# Patient Record
Sex: MALE | Race: WHITE | Employment: STUDENT | URBAN - METROPOLITAN AREA
[De-identification: names, ages, dates, MRNs, and addresses within clinical notes are randomized per-mention and may not be internally consistent; named-entity substitution may affect disease eponyms.]

---
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
## 2021-03-22 ENCOUNTER — WALK IN (OUTPATIENT)
Dept: URGENT CARE | Age: 21
End: 2021-03-22

## 2021-03-22 VITALS
HEART RATE: 80 BPM | DIASTOLIC BLOOD PRESSURE: 70 MMHG | SYSTOLIC BLOOD PRESSURE: 150 MMHG | OXYGEN SATURATION: 99 % | RESPIRATION RATE: 16 BRPM | TEMPERATURE: 98.2 F

## 2021-03-22 DIAGNOSIS — R11.0 NAUSEA: ICD-10-CM

## 2021-03-22 DIAGNOSIS — R19.7 DIARRHEA, UNSPECIFIED TYPE: Primary | ICD-10-CM

## 2021-03-22 LAB — SARS-COV+SARS-COV-2 AG RESP QL IA.RAPID: NOT DETECTED

## 2021-03-22 PROCEDURE — 87426 SARSCOV CORONAVIRUS AG IA: CPT | Performed by: EMERGENCY MEDICINE

## 2021-03-22 PROCEDURE — 99213 OFFICE O/P EST LOW 20 MIN: CPT | Performed by: EMERGENCY MEDICINE

## 2021-03-22 RX ORDER — ONDANSETRON 4 MG/1
4 TABLET, FILM COATED ORAL EVERY 8 HOURS PRN
Qty: 10 TABLET | Refills: 0 | Status: SHIPPED | OUTPATIENT
Start: 2021-03-22 | End: 2021-03-27

## 2021-05-26 VITALS
RESPIRATION RATE: 20 BRPM | HEART RATE: 76 BPM | DIASTOLIC BLOOD PRESSURE: 80 MMHG | OXYGEN SATURATION: 100 % | TEMPERATURE: 97.9 F | SYSTOLIC BLOOD PRESSURE: 128 MMHG

## 2021-07-06 ENCOUNTER — OFFICE VISIT (OUTPATIENT)
Dept: FAMILY MEDICINE CLINIC | Facility: CLINIC | Age: 21
End: 2021-07-06

## 2021-07-06 VITALS
TEMPERATURE: 98 F | WEIGHT: 183 LBS | BODY MASS INDEX: 25.06 KG/M2 | RESPIRATION RATE: 16 BRPM | SYSTOLIC BLOOD PRESSURE: 132 MMHG | HEIGHT: 71.5 IN | OXYGEN SATURATION: 99 % | DIASTOLIC BLOOD PRESSURE: 68 MMHG | HEART RATE: 99 BPM

## 2021-07-06 DIAGNOSIS — Z02.0 SCHOOL PHYSICAL EXAM: Primary | ICD-10-CM

## 2021-07-06 PROCEDURE — 3008F BODY MASS INDEX DOCD: CPT | Performed by: NURSE PRACTITIONER

## 2021-07-06 PROCEDURE — 99385 PREV VISIT NEW AGE 18-39: CPT | Performed by: NURSE PRACTITIONER

## 2021-07-06 PROCEDURE — 3078F DIAST BP <80 MM HG: CPT | Performed by: NURSE PRACTITIONER

## 2021-07-06 PROCEDURE — 3075F SYST BP GE 130 - 139MM HG: CPT | Performed by: NURSE PRACTITIONER

## 2021-07-06 NOTE — PROGRESS NOTES
CHIEF COMPLAINT:   Patient presents with:  Physical: Entered by patient      HPI:   Aaron Lechuga is a 22year old male who presents for a physical exam for [employer] 3rd year. Patient has no health concerns.   Unable to get into PCP till Visual Acuity     Vision Screen Test Type: Snellen Wall Chart    Right Eye Visual Acuity: Uncorrected Right Eye Chart Acuity: 20/20   Left Eye Visual Acuity: Uncorrected Left Eye Chart Acuity: 20/20   Both Eyes Visual Acuity: Uncorrected Both Eyes Vicky

## 2021-12-06 ENCOUNTER — HOSPITAL ENCOUNTER (OUTPATIENT)
Age: 21
Discharge: HOME | End: 2021-12-06
Attending: EMERGENCY MEDICINE
Payer: COMMERCIAL

## 2021-12-06 VITALS
DIASTOLIC BLOOD PRESSURE: 72 MMHG | BODY MASS INDEX: 26 KG/M2 | RESPIRATION RATE: 20 BRPM | HEART RATE: 104 BPM | TEMPERATURE: 98 F | WEIGHT: 190 LBS | OXYGEN SATURATION: 100 % | SYSTOLIC BLOOD PRESSURE: 135 MMHG

## 2021-12-06 DIAGNOSIS — B34.9 VIRAL SYNDROME: Primary | ICD-10-CM

## 2021-12-06 PROCEDURE — 99203 OFFICE O/P NEW LOW 30 MIN: CPT

## 2021-12-06 PROCEDURE — 87502 INFLUENZA DNA AMP PROBE: CPT | Performed by: EMERGENCY MEDICINE

## 2021-12-06 PROCEDURE — 99212 OFFICE O/P EST SF 10 MIN: CPT

## 2021-12-06 NOTE — ED PROVIDER NOTES
Patient Seen in: [facility]      History   Patient presents with:   Body ache and/or chills    Stated Complaint: covid test    Subjective:   HPI    81-year-old male complaining of fever achiness runny nose slight cough generalized body ach nonspecific viral syndrome advised Tylenol Advil follow-up doctor in few days return any problems.                              Disposition and Plan     Clinical Impression:  Viral syndrome  (primary encounter diagnosis)     Disposition:  Discharge  12/6/20

## 2022-01-04 ENCOUNTER — WALK IN (OUTPATIENT)
Dept: URGENT CARE | Age: 22
End: 2022-01-04

## 2022-01-04 VITALS
HEART RATE: 98 BPM | RESPIRATION RATE: 18 BRPM | OXYGEN SATURATION: 98 % | DIASTOLIC BLOOD PRESSURE: 83 MMHG | SYSTOLIC BLOOD PRESSURE: 150 MMHG | TEMPERATURE: 98.2 F

## 2022-01-04 DIAGNOSIS — Z20.822 SUSPECTED COVID-19 VIRUS INFECTION: Primary | ICD-10-CM

## 2022-01-04 LAB — SARS-COV+SARS-COV-2 AG RESP QL IA.RAPID: DETECTED

## 2022-01-04 PROCEDURE — 99214 OFFICE O/P EST MOD 30 MIN: CPT | Performed by: FAMILY MEDICINE

## 2022-01-04 PROCEDURE — 87426 SARSCOV CORONAVIRUS AG IA: CPT | Performed by: FAMILY MEDICINE

## 2022-06-28 ENCOUNTER — OFFICE VISIT (OUTPATIENT)
Dept: INTERNAL MEDICINE CLINIC | Facility: CLINIC | Age: 22
End: 2022-06-28
Payer: COMMERCIAL

## 2022-06-28 VITALS
HEIGHT: 71.5 IN | DIASTOLIC BLOOD PRESSURE: 82 MMHG | WEIGHT: 197 LBS | RESPIRATION RATE: 16 BRPM | HEART RATE: 81 BPM | OXYGEN SATURATION: 99 % | BODY MASS INDEX: 26.98 KG/M2 | TEMPERATURE: 98 F | SYSTOLIC BLOOD PRESSURE: 126 MMHG

## 2022-06-28 DIAGNOSIS — Z13.29 THYROID DISORDER SCREEN: ICD-10-CM

## 2022-06-28 DIAGNOSIS — Z13.0 SCREENING, IRON DEFICIENCY ANEMIA: ICD-10-CM

## 2022-06-28 DIAGNOSIS — Z13.89 SCREENING FOR GENITOURINARY CONDITION: ICD-10-CM

## 2022-06-28 DIAGNOSIS — Z13.220 LIPID SCREENING: ICD-10-CM

## 2022-06-28 DIAGNOSIS — Z23 NEED FOR TDAP VACCINATION: ICD-10-CM

## 2022-06-28 DIAGNOSIS — Z00.00 LABORATORY EXAMINATION ORDERED AS PART OF A ROUTINE GENERAL MEDICAL EXAMINATION: ICD-10-CM

## 2022-06-28 DIAGNOSIS — Z00.00 ANNUAL PHYSICAL EXAM: Primary | ICD-10-CM

## 2022-06-28 PROCEDURE — 90471 IMMUNIZATION ADMIN: CPT | Performed by: INTERNAL MEDICINE

## 2022-06-28 PROCEDURE — 3008F BODY MASS INDEX DOCD: CPT | Performed by: INTERNAL MEDICINE

## 2022-06-28 PROCEDURE — 99395 PREV VISIT EST AGE 18-39: CPT | Performed by: INTERNAL MEDICINE

## 2022-06-28 PROCEDURE — 3079F DIAST BP 80-89 MM HG: CPT | Performed by: INTERNAL MEDICINE

## 2022-06-28 PROCEDURE — 90715 TDAP VACCINE 7 YRS/> IM: CPT | Performed by: INTERNAL MEDICINE

## 2022-06-28 PROCEDURE — 3074F SYST BP LT 130 MM HG: CPT | Performed by: INTERNAL MEDICINE

## 2022-07-07 ENCOUNTER — LAB ENCOUNTER (OUTPATIENT)
Dept: LAB | Age: 22
End: 2022-07-07
Attending: INTERNAL MEDICINE
Payer: COMMERCIAL

## 2022-07-07 DIAGNOSIS — Z13.220 LIPID SCREENING: ICD-10-CM

## 2022-07-07 DIAGNOSIS — Z00.00 LABORATORY EXAMINATION ORDERED AS PART OF A ROUTINE GENERAL MEDICAL EXAMINATION: ICD-10-CM

## 2022-07-07 DIAGNOSIS — Z13.29 THYROID DISORDER SCREEN: ICD-10-CM

## 2022-07-07 DIAGNOSIS — Z13.89 SCREENING FOR GENITOURINARY CONDITION: ICD-10-CM

## 2022-07-07 DIAGNOSIS — Z13.0 SCREENING, IRON DEFICIENCY ANEMIA: ICD-10-CM

## 2022-07-07 PROBLEM — E03.8 SUBCLINICAL HYPOTHYROIDISM: Status: ACTIVE | Noted: 2022-07-07

## 2022-07-07 LAB
ALBUMIN SERPL-MCNC: 4.2 G/DL (ref 3.4–5)
ALBUMIN/GLOB SERPL: 1.4 {RATIO} (ref 1–2)
ALP LIVER SERPL-CCNC: 64 U/L
ALT SERPL-CCNC: 50 U/L
ANION GAP SERPL CALC-SCNC: 5 MMOL/L (ref 0–18)
AST SERPL-CCNC: 29 U/L (ref 15–37)
BASOPHILS # BLD AUTO: 0.03 X10(3) UL (ref 0–0.2)
BASOPHILS NFR BLD AUTO: 0.5 %
BILIRUB SERPL-MCNC: 0.6 MG/DL (ref 0.1–2)
BILIRUB UR QL STRIP.AUTO: NEGATIVE
BUN BLD-MCNC: 17 MG/DL (ref 7–18)
CALCIUM BLD-MCNC: 9.9 MG/DL (ref 8.5–10.1)
CHLORIDE SERPL-SCNC: 108 MMOL/L (ref 98–112)
CHOLEST SERPL-MCNC: 178 MG/DL (ref ?–200)
CLARITY UR REFRACT.AUTO: CLEAR
CO2 SERPL-SCNC: 27 MMOL/L (ref 21–32)
COLOR UR AUTO: YELLOW
CREAT BLD-MCNC: 1.04 MG/DL
EOSINOPHIL # BLD AUTO: 0.11 X10(3) UL (ref 0–0.7)
EOSINOPHIL NFR BLD AUTO: 1.9 %
ERYTHROCYTE [DISTWIDTH] IN BLOOD BY AUTOMATED COUNT: 12.2 %
EST. AVERAGE GLUCOSE BLD GHB EST-MCNC: 100 MG/DL (ref 68–126)
FASTING PATIENT LIPID ANSWER: YES
FASTING STATUS PATIENT QL REPORTED: YES
GLOBULIN PLAS-MCNC: 3 G/DL (ref 2.8–4.4)
GLUCOSE BLD-MCNC: 83 MG/DL (ref 70–99)
GLUCOSE UR STRIP.AUTO-MCNC: NEGATIVE MG/DL
HBA1C MFR BLD: 5.1 % (ref ?–5.7)
HCT VFR BLD AUTO: 47.2 %
HDLC SERPL-MCNC: 38 MG/DL (ref 40–59)
HGB BLD-MCNC: 15.4 G/DL
IMM GRANULOCYTES # BLD AUTO: 0.02 X10(3) UL (ref 0–1)
IMM GRANULOCYTES NFR BLD: 0.3 %
KETONES UR STRIP.AUTO-MCNC: NEGATIVE MG/DL
LDLC SERPL CALC-MCNC: 117 MG/DL (ref ?–100)
LYMPHOCYTES # BLD AUTO: 1.63 X10(3) UL (ref 1–4)
LYMPHOCYTES NFR BLD AUTO: 27.8 %
MCH RBC QN AUTO: 31.4 PG (ref 26–34)
MCHC RBC AUTO-ENTMCNC: 32.6 G/DL (ref 31–37)
MCV RBC AUTO: 96.3 FL
MONOCYTES # BLD AUTO: 0.73 X10(3) UL (ref 0.1–1)
MONOCYTES NFR BLD AUTO: 12.5 %
NEUTROPHILS # BLD AUTO: 3.34 X10 (3) UL (ref 1.5–7.7)
NEUTROPHILS # BLD AUTO: 3.34 X10(3) UL (ref 1.5–7.7)
NEUTROPHILS NFR BLD AUTO: 57 %
NITRITE UR QL STRIP.AUTO: NEGATIVE
NONHDLC SERPL-MCNC: 140 MG/DL (ref ?–130)
OSMOLALITY SERPL CALC.SUM OF ELEC: 291 MOSM/KG (ref 275–295)
PH UR STRIP.AUTO: 6 [PH] (ref 5–8)
PLATELET # BLD AUTO: 242 10(3)UL (ref 150–450)
POTASSIUM SERPL-SCNC: 4 MMOL/L (ref 3.5–5.1)
PROT SERPL-MCNC: 7.2 G/DL (ref 6.4–8.2)
PROT UR STRIP.AUTO-MCNC: NEGATIVE MG/DL
RBC # BLD AUTO: 4.9 X10(6)UL
RBC UR QL AUTO: NEGATIVE
SODIUM SERPL-SCNC: 140 MMOL/L (ref 136–145)
SP GR UR STRIP.AUTO: >=1.03 (ref 1–1.03)
T4 FREE SERPL-MCNC: 0.9 NG/DL (ref 0.8–1.7)
TRIGL SERPL-MCNC: 126 MG/DL (ref 30–149)
TSI SER-ACNC: 4.34 MIU/ML (ref 0.36–3.74)
UROBILINOGEN UR STRIP.AUTO-MCNC: 0.2 MG/DL
VLDLC SERPL CALC-MCNC: 22 MG/DL (ref 0–30)
WBC # BLD AUTO: 5.9 X10(3) UL (ref 4–11)

## 2022-07-07 PROCEDURE — 84443 ASSAY THYROID STIM HORMONE: CPT

## 2022-07-07 PROCEDURE — 81001 URINALYSIS AUTO W/SCOPE: CPT

## 2022-07-07 PROCEDURE — 36415 COLL VENOUS BLD VENIPUNCTURE: CPT

## 2022-07-07 PROCEDURE — 83036 HEMOGLOBIN GLYCOSYLATED A1C: CPT

## 2022-07-07 PROCEDURE — 80053 COMPREHEN METABOLIC PANEL: CPT

## 2022-07-07 PROCEDURE — 84439 ASSAY OF FREE THYROXINE: CPT

## 2022-07-07 PROCEDURE — 80061 LIPID PANEL: CPT

## 2022-07-07 PROCEDURE — 85025 COMPLETE CBC W/AUTO DIFF WBC: CPT

## 2022-07-13 ENCOUNTER — TELEPHONE (OUTPATIENT)
Dept: INTERNAL MEDICINE CLINIC | Facility: CLINIC | Age: 22
End: 2022-07-13

## 2022-07-13 DIAGNOSIS — R82.71 BACTERIA IN URINE: Primary | ICD-10-CM

## 2022-07-13 NOTE — TELEPHONE ENCOUNTER
Patient returned call regarding these test results. We had been trying to contact patient regarding these results. Pt verbalized understanding and intent to comply. UA w/reflex culture placed. Advised patient to go to any [facility] lab to have urine test rechecked. Pt verbalized understanding.

## 2022-08-10 ENCOUNTER — OFFICE VISIT (OUTPATIENT)
Dept: INTERNAL MEDICINE CLINIC | Facility: CLINIC | Age: 22
End: 2022-08-10
Payer: COMMERCIAL

## 2022-08-10 DIAGNOSIS — Z02.9 ADMINISTRATIVE ENCOUNTER: Primary | ICD-10-CM

## 2023-09-01 ENCOUNTER — OFFICE VISIT (OUTPATIENT)
Dept: INTERNAL MEDICINE CLINIC | Facility: CLINIC | Age: 23
End: 2023-09-01
Payer: COMMERCIAL

## 2023-09-01 VITALS
TEMPERATURE: 99 F | HEIGHT: 71.5 IN | BODY MASS INDEX: 27.52 KG/M2 | OXYGEN SATURATION: 99 % | WEIGHT: 201 LBS | HEART RATE: 83 BPM | RESPIRATION RATE: 18 BRPM | SYSTOLIC BLOOD PRESSURE: 108 MMHG | DIASTOLIC BLOOD PRESSURE: 76 MMHG

## 2023-09-01 DIAGNOSIS — Z00.00 ANNUAL PHYSICAL EXAM: Primary | ICD-10-CM

## 2023-09-01 DIAGNOSIS — Z00.00 ROUTINE GENERAL MEDICAL EXAMINATION AT A HEALTH CARE FACILITY: ICD-10-CM

## 2023-09-11 ENCOUNTER — LAB ENCOUNTER (OUTPATIENT)
Dept: LAB | Age: 23
End: 2023-09-11
Attending: INTERNAL MEDICINE
Payer: COMMERCIAL

## 2023-09-11 DIAGNOSIS — Z00.00 ROUTINE GENERAL MEDICAL EXAMINATION AT A HEALTH CARE FACILITY: ICD-10-CM

## 2023-09-11 LAB
ALBUMIN SERPL-MCNC: 4.3 G/DL (ref 3.4–5)
ALBUMIN/GLOB SERPL: 1.4 {RATIO} (ref 1–2)
ALP LIVER SERPL-CCNC: 62 U/L
ALT SERPL-CCNC: 62 U/L
ANION GAP SERPL CALC-SCNC: 6 MMOL/L (ref 0–18)
AST SERPL-CCNC: 31 U/L (ref 15–37)
BASOPHILS # BLD AUTO: 0.02 X10(3) UL (ref 0–0.2)
BASOPHILS NFR BLD AUTO: 0.3 %
BILIRUB SERPL-MCNC: 0.3 MG/DL (ref 0.1–2)
BILIRUB UR QL STRIP.AUTO: NEGATIVE
BUN BLD-MCNC: 20 MG/DL (ref 7–18)
CALCIUM BLD-MCNC: 9.6 MG/DL (ref 8.5–10.1)
CHLORIDE SERPL-SCNC: 107 MMOL/L (ref 98–112)
CHOLEST SERPL-MCNC: 177 MG/DL (ref ?–200)
CLARITY UR REFRACT.AUTO: CLEAR
CO2 SERPL-SCNC: 26 MMOL/L (ref 21–32)
CREAT BLD-MCNC: 0.92 MG/DL
EGFRCR SERPLBLD CKD-EPI 2021: 121 ML/MIN/1.73M2 (ref 60–?)
EOSINOPHIL # BLD AUTO: 0.14 X10(3) UL (ref 0–0.7)
EOSINOPHIL NFR BLD AUTO: 2.4 %
ERYTHROCYTE [DISTWIDTH] IN BLOOD BY AUTOMATED COUNT: 12.3 %
FASTING PATIENT LIPID ANSWER: YES
FASTING STATUS PATIENT QL REPORTED: YES
GLOBULIN PLAS-MCNC: 3 G/DL (ref 2.8–4.4)
GLUCOSE BLD-MCNC: 99 MG/DL (ref 70–99)
GLUCOSE UR STRIP.AUTO-MCNC: NORMAL MG/DL
HCT VFR BLD AUTO: 45.1 %
HDLC SERPL-MCNC: 41 MG/DL (ref 40–59)
HGB BLD-MCNC: 15.2 G/DL
IMM GRANULOCYTES # BLD AUTO: 0.03 X10(3) UL (ref 0–1)
IMM GRANULOCYTES NFR BLD: 0.5 %
KETONES UR STRIP.AUTO-MCNC: NEGATIVE MG/DL
LDLC SERPL CALC-MCNC: 111 MG/DL (ref ?–100)
LEUKOCYTE ESTERASE UR QL STRIP.AUTO: NEGATIVE
LYMPHOCYTES # BLD AUTO: 1.39 X10(3) UL (ref 1–4)
LYMPHOCYTES NFR BLD AUTO: 23.9 %
MCH RBC QN AUTO: 31.4 PG (ref 26–34)
MCHC RBC AUTO-ENTMCNC: 33.7 G/DL (ref 31–37)
MCV RBC AUTO: 93.2 FL
MONOCYTES # BLD AUTO: 0.76 X10(3) UL (ref 0.1–1)
MONOCYTES NFR BLD AUTO: 13.1 %
NEUTROPHILS # BLD AUTO: 3.48 X10 (3) UL (ref 1.5–7.7)
NEUTROPHILS # BLD AUTO: 3.48 X10(3) UL (ref 1.5–7.7)
NEUTROPHILS NFR BLD AUTO: 59.8 %
NITRITE UR QL STRIP.AUTO: NEGATIVE
NONHDLC SERPL-MCNC: 136 MG/DL (ref ?–130)
OSMOLALITY SERPL CALC.SUM OF ELEC: 291 MOSM/KG (ref 275–295)
PH UR STRIP.AUTO: 6 [PH] (ref 5–8)
PLATELET # BLD AUTO: 221 10(3)UL (ref 150–450)
POTASSIUM SERPL-SCNC: 4.1 MMOL/L (ref 3.5–5.1)
PROT SERPL-MCNC: 7.3 G/DL (ref 6.4–8.2)
PROT UR STRIP.AUTO-MCNC: NEGATIVE MG/DL
RBC # BLD AUTO: 4.84 X10(6)UL
RBC UR QL AUTO: NEGATIVE
SODIUM SERPL-SCNC: 139 MMOL/L (ref 136–145)
SP GR UR STRIP.AUTO: 1.03 (ref 1–1.03)
TRIGL SERPL-MCNC: 138 MG/DL (ref 30–149)
TSI SER-ACNC: 1.99 MIU/ML (ref 0.36–3.74)
UROBILINOGEN UR STRIP.AUTO-MCNC: NORMAL MG/DL
VLDLC SERPL CALC-MCNC: 24 MG/DL (ref 0–30)
WBC # BLD AUTO: 5.8 X10(3) UL (ref 4–11)

## 2023-09-11 PROCEDURE — 36415 COLL VENOUS BLD VENIPUNCTURE: CPT

## 2023-09-11 PROCEDURE — 84443 ASSAY THYROID STIM HORMONE: CPT

## 2023-09-11 PROCEDURE — 80061 LIPID PANEL: CPT

## 2023-09-11 PROCEDURE — 81003 URINALYSIS AUTO W/O SCOPE: CPT

## 2023-09-11 PROCEDURE — 85025 COMPLETE CBC W/AUTO DIFF WBC: CPT

## 2023-09-11 PROCEDURE — 80053 COMPREHEN METABOLIC PANEL: CPT

## 2023-09-26 ENCOUNTER — WALK IN (OUTPATIENT)
Dept: URGENT CARE | Age: 23
End: 2023-09-26

## 2023-09-26 VITALS
SYSTOLIC BLOOD PRESSURE: 120 MMHG | HEART RATE: 88 BPM | TEMPERATURE: 97.1 F | DIASTOLIC BLOOD PRESSURE: 76 MMHG | OXYGEN SATURATION: 100 % | RESPIRATION RATE: 18 BRPM

## 2023-09-26 DIAGNOSIS — J02.9 SORE THROAT: Primary | ICD-10-CM

## 2023-09-26 LAB
INTERNAL PROCEDURAL CONTROLS ACCEPTABLE: NO
INTERNAL PROCEDURAL CONTROLS ACCEPTABLE: YES
S PYO AG THROAT QL IA.RAPID: NEGATIVE
SARS-COV+SARS-COV-2 AG RESP QL IA.RAPID: NOT DETECTED
TEST LOT EXPIRATION DATE: ABNORMAL
TEST LOT EXPIRATION DATE: NORMAL
TEST LOT NUMBER: ABNORMAL
TEST LOT NUMBER: NORMAL

## 2023-09-26 PROCEDURE — 87426 SARSCOV CORONAVIRUS AG IA: CPT | Performed by: FAMILY MEDICINE

## 2023-09-26 PROCEDURE — 87880 STREP A ASSAY W/OPTIC: CPT | Performed by: FAMILY MEDICINE

## 2023-09-26 PROCEDURE — 99214 OFFICE O/P EST MOD 30 MIN: CPT | Performed by: FAMILY MEDICINE

## 2023-09-26 RX ORDER — AZITHROMYCIN 250 MG/1
TABLET, FILM COATED ORAL
Qty: 6 TABLET | Refills: 0 | Status: SHIPPED | OUTPATIENT
Start: 2023-09-26 | End: 2023-10-01

## 2023-09-26 ASSESSMENT — PAIN SCALES - GENERAL: PAINLEVEL: 0

## 2025-03-06 NOTE — PROGRESS NOTES
Subjective:   Patient ID: Paulie Ochoa is a 25 year old male.    HPI  Paulie Ochoa is a 25 year old male who presents for a complete physical exam.   HPI:   Pt reports normal state of health  He has a hx of chronic lumbar pain. Radiates to left leg. Freq increasing. Severit increasing and now no triggering events when pain occurs    PAST MEDICAL, SOCIAL, FAMILY HISTORIES REVIEWED WITH PT  .    Immunization History   Administered Date(s) Administered    Covid-19 Vaccine Moderna 100 mcg/0.5 ml 08/22/2021, 09/19/2021    DTAP 03/21/2001, 05/16/2001, 07/31/2001, 09/12/2002, 05/24/2006, 05/24/2006    HEP B 02/26/2003    HEP B Vaccine 03/21/2001, 05/16/2001    HEP B, Ped/Adol 03/21/2001, 05/16/2001    HIB 03/21/2001, 05/16/2001, 02/26/2003    HPV (Gardasil) 06/03/2015, 08/03/2015    Hep B, Unspecified Formulation 03/21/2001, 05/16/2001, 02/26/2003    IPV 03/21/2001, 05/16/2001, 07/31/2001, 05/24/2006    Influenza 11/13/2006, 12/20/2006    MMR 01/15/2003, 05/24/2006    Meningococcal-Menactra 06/03/2015, 08/14/2018    TDAP 06/27/2012, 06/28/2022    Varicella 01/15/2003, 06/27/2012   Pended Date(s) Pended    FLULAVAL 6 months & older 0.5 ml Prefilled syringe ([id number]) 09/14/2020     Wt Readings from Last 6 Encounters:   01/28/25 195 lb 3.2 oz (88.5 kg)   09/01/23 201 lb (91.2 kg)   06/28/22 197 lb (89.4 kg)   12/06/21 190 lb (86.2 kg)   07/06/21 183 lb (83 kg)   09/14/20 173 lb (78.5 kg) (75%, Z= 0.66)*     * Growth percentiles are based on CDC (Boys, 2-20 Years) data.     There is no height or weight on file to calculate BMI.     Lab Results   Component Value Date    GLU 99 09/11/2023    GLU 83 07/07/2022    GLU 88 10/02/2020     Lab Results   Component Value Date    CHOLEST 177 09/11/2023    CHOLEST 178 07/07/2022    CHOLEST 159 10/02/2020     Lab Results   Component Value Date    HDL 41 09/11/2023    HDL 38 (L) 07/07/2022    HDL 50 10/02/2020     Lab Results   Component Value Date     (H) 09/11/2023     (H)  07/07/2022    LDL 83 10/02/2020     Lab Results   Component Value Date    AST 31 09/11/2023    AST 29 07/07/2022    AST 17 10/02/2020     Lab Results   Component Value Date    ALT 62 (H) 09/11/2023    ALT 50 07/07/2022    ALT 40 10/02/2020     No results found for: \"PSA\"     No current outpatient medications on file.      Past Medical History:    Abdominal pain    Back pain    Bloating    Constipation    Diarrhea, unspecified    Flatulence/gas pain/belching    Heartburn    Indigestion    Nausea      Past Surgical History:   Procedure Laterality Date    Tonsillectomy  2011      Family History   Problem Relation Age of Onset    Cancer Maternal Grandmother     Stroke Maternal Grandfather     Heart Attack Paternal Grandfather 45    High Cholesterol Father       Social History:  Social History     Socioeconomic History    Marital status: Single   Tobacco Use    Smoking status: Every Day    Smokeless tobacco: Never   Vaping Use    Vaping status: Every Day    Substances: Nicotine, THC    Devices: Pre-filled or refillable cartridge   Substance and Sexual Activity    Alcohol use: No    Drug use: Yes     Frequency: 7.0 times per week     Types: Cannabis   Other Topics Concern    Caffeine Concern No    Exercise Yes     Comment: 3x/week    Seat Belt Yes      Occ: yes..   Exercise: once per week,  twice per week.  Diet: watches fats closely and watches sugar closely     REVIEW OF SYSTEMS:   GENERAL: feels well otherwise  A comprehensive 10 point review of systems was completed.     Pertinent positives and negatives noted in the HPI.      EXAM:   There were no vitals taken for this visit.  There is no height or weight on file to calculate BMI.   GENERAL: well developed, well nourished,in no apparent distress  SKIN: no rashes,no suspicious lesions  HEENT: atraumatic, normocephalic,ears and throat are clear  EYES:PERRLA, EOMI, ,conjunctiva are clear  NECK: supple,no adenopathy,no bruits  LUNGS: clear to auscultation  CARDIO: RRR  without murmur  GI: good BS's,no masses, HSM or tenderness  : deferred  MUSCULOSKELETAL: back is not tender  EXTREMITIES: no cyanosis, clubbing or edema  NEURO: Oriented times three,motor and sensory are grossly intact    ASSESSMENT AND PLAN:   Paulie Ochoa is a 25 year old male who presents for a complete physical exam. There is no height or weight on file to calculate BMI., recommended low fat diet and aerobic exercise 30 minutes three times weekly.     Health maintenance, will check fasting Lipids, CMP, CBC.     Chronic lumbar radiculopathy- refer to ortho spine for eval    Pt info handouts given for: exercise, low fat diet    The patient indicates understanding of these issues and agrees to the plan.  The patient is asked to return for CPX in 12 m.    History/Other:   Review of Systems  No current outpatient medications on file.     Allergies:Allergies[1]    Objective:   Physical Exam    Assessment & Plan:   1. Annual physical exam    2. Routine general medical examination at a health care facility        No orders of the defined types were placed in this encounter.      Meds This Visit:  Requested Prescriptions      No prescriptions requested or ordered in this encounter       Imaging & Referrals:  None         [1] No Known Allergies

## 2025-03-08 ENCOUNTER — OFFICE VISIT (OUTPATIENT)
Dept: INTERNAL MEDICINE CLINIC | Facility: CLINIC | Age: 25
End: 2025-03-08
Payer: COMMERCIAL

## 2025-03-08 VITALS
BODY MASS INDEX: 26.82 KG/M2 | HEART RATE: 75 BPM | DIASTOLIC BLOOD PRESSURE: 80 MMHG | WEIGHT: 198 LBS | HEIGHT: 72 IN | RESPIRATION RATE: 16 BRPM | OXYGEN SATURATION: 99 % | SYSTOLIC BLOOD PRESSURE: 128 MMHG | TEMPERATURE: 98 F

## 2025-03-08 DIAGNOSIS — Z00.00 ROUTINE GENERAL MEDICAL EXAMINATION AT A HEALTH CARE FACILITY: ICD-10-CM

## 2025-03-08 DIAGNOSIS — Z00.00 ANNUAL PHYSICAL EXAM: Primary | ICD-10-CM

## 2025-03-08 DIAGNOSIS — G89.29 CHRONIC LEFT-SIDED LOW BACK PAIN WITH RIGHT-SIDED SCIATICA: ICD-10-CM

## 2025-03-08 DIAGNOSIS — M54.41 CHRONIC LEFT-SIDED LOW BACK PAIN WITH RIGHT-SIDED SCIATICA: ICD-10-CM

## 2025-03-08 DIAGNOSIS — Z23 NEED FOR PNEUMOCOCCAL VACCINATION: ICD-10-CM

## 2025-03-08 PROBLEM — F17.200 TOBACCO DEPENDENCE: Status: ACTIVE | Noted: 2025-03-08

## 2025-03-08 PROCEDURE — 90677 PCV20 VACCINE IM: CPT | Performed by: INTERNAL MEDICINE

## 2025-03-08 PROCEDURE — 99395 PREV VISIT EST AGE 18-39: CPT | Performed by: INTERNAL MEDICINE

## 2025-03-08 PROCEDURE — 90471 IMMUNIZATION ADMIN: CPT | Performed by: INTERNAL MEDICINE

## 2025-03-20 ENCOUNTER — TELEPHONE (OUTPATIENT)
Facility: CLINIC | Age: 25
End: 2025-03-20

## 2025-03-20 DIAGNOSIS — M54.50 LOW BACK PAIN, UNSPECIFIED BACK PAIN LATERALITY, UNSPECIFIED CHRONICITY, UNSPECIFIED WHETHER SCIATICA PRESENT: Primary | ICD-10-CM

## 2025-03-20 NOTE — TELEPHONE ENCOUNTER
Patient scheduled for lower left lumbar disc issue.      Future Appointments   Date Time Provider Department Center   3/29/2025  8:15 AM [facility] [place] [facility] [address]   4/4/2025  7:00 AM Nathan Metzger MD [facility] [address]   3/13/2026  8:30 AM Mahamed Carpenter MD [facility] [address] [facility] [address]     Please advise if imaging is needed.

## 2025-03-20 NOTE — TELEPHONE ENCOUNTER
X rays scheduled. Patient called and notified of appt.      Future Appointments   Date Time Provider Department Center   3/29/2025  8:15 AM [facility] [address]   4/4/2025  6:50 AM [facility] [place]   4/4/2025  7:00 AM Nathan Metzger MD [facility] [address]   3/13/2026  8:30 AM Mahamed Carpenter MD [facility] [address] [facility] [address]

## 2025-03-29 ENCOUNTER — LABORATORY ENCOUNTER (OUTPATIENT)
Dept: LAB | Age: 25
End: 2025-03-29
Attending: INTERNAL MEDICINE
Payer: COMMERCIAL

## 2025-03-29 DIAGNOSIS — Z00.00 ROUTINE GENERAL MEDICAL EXAMINATION AT A HEALTH CARE FACILITY: ICD-10-CM

## 2025-03-29 LAB
ALBUMIN SERPL-MCNC: 5.4 G/DL (ref 3.2–4.8)
ALBUMIN/GLOB SERPL: 2.3 {RATIO} (ref 1–2)
ALP LIVER SERPL-CCNC: 69 U/L
ALT SERPL-CCNC: 38 U/L
ANION GAP SERPL CALC-SCNC: 11 MMOL/L (ref 0–18)
AST SERPL-CCNC: 24 U/L (ref ?–34)
BASOPHILS # BLD AUTO: 0.02 X10(3) UL (ref 0–0.2)
BASOPHILS NFR BLD AUTO: 0.4 %
BILIRUB SERPL-MCNC: 0.9 MG/DL (ref 0.3–1.2)
BILIRUB UR QL STRIP.AUTO: NEGATIVE
BUN BLD-MCNC: 20 MG/DL (ref 9–23)
CALCIUM BLD-MCNC: 10.7 MG/DL (ref 8.7–10.6)
CHLORIDE SERPL-SCNC: 103 MMOL/L (ref 98–112)
CHOLEST SERPL-MCNC: 198 MG/DL (ref ?–200)
CLARITY UR REFRACT.AUTO: CLEAR
CO2 SERPL-SCNC: 28 MMOL/L (ref 21–32)
COLOR UR AUTO: YELLOW
CREAT BLD-MCNC: 1.18 MG/DL
EGFRCR SERPLBLD CKD-EPI 2021: 88 ML/MIN/1.73M2 (ref 60–?)
EOSINOPHIL # BLD AUTO: 0.07 X10(3) UL (ref 0–0.7)
EOSINOPHIL NFR BLD AUTO: 1.3 %
ERYTHROCYTE [DISTWIDTH] IN BLOOD BY AUTOMATED COUNT: 12.3 %
FASTING PATIENT LIPID ANSWER: YES
FASTING STATUS PATIENT QL REPORTED: YES
GLOBULIN PLAS-MCNC: 2.4 G/DL (ref 2–3.5)
GLUCOSE BLD-MCNC: 93 MG/DL (ref 70–99)
GLUCOSE UR STRIP.AUTO-MCNC: NORMAL MG/DL
HCT VFR BLD AUTO: 48.5 %
HDLC SERPL-MCNC: 38 MG/DL (ref 40–59)
HGB BLD-MCNC: 16.5 G/DL
IMM GRANULOCYTES # BLD AUTO: 0.01 X10(3) UL (ref 0–1)
IMM GRANULOCYTES NFR BLD: 0.2 %
LDLC SERPL CALC-MCNC: 137 MG/DL (ref ?–100)
LEUKOCYTE ESTERASE UR QL STRIP.AUTO: NEGATIVE
LYMPHOCYTES # BLD AUTO: 1.67 X10(3) UL (ref 1–4)
LYMPHOCYTES NFR BLD AUTO: 31.8 %
MCH RBC QN AUTO: 31.3 PG (ref 26–34)
MCHC RBC AUTO-ENTMCNC: 34 G/DL (ref 31–37)
MCV RBC AUTO: 92 FL
MONOCYTES # BLD AUTO: 0.53 X10(3) UL (ref 0.1–1)
MONOCYTES NFR BLD AUTO: 10.1 %
NEUTROPHILS # BLD AUTO: 2.95 X10 (3) UL (ref 1.5–7.7)
NEUTROPHILS # BLD AUTO: 2.95 X10(3) UL (ref 1.5–7.7)
NEUTROPHILS NFR BLD AUTO: 56.2 %
NITRITE UR QL STRIP.AUTO: NEGATIVE
NONHDLC SERPL-MCNC: 160 MG/DL (ref ?–130)
OSMOLALITY SERPL CALC.SUM OF ELEC: 296 MOSM/KG (ref 275–295)
PH UR STRIP.AUTO: 6.5 [PH] (ref 5–8)
PLATELET # BLD AUTO: 263 10(3)UL (ref 150–450)
POTASSIUM SERPL-SCNC: 4.6 MMOL/L (ref 3.5–5.1)
PROT SERPL-MCNC: 7.8 G/DL (ref 5.7–8.2)
PROT UR STRIP.AUTO-MCNC: NEGATIVE MG/DL
RBC # BLD AUTO: 5.27 X10(6)UL
RBC UR QL AUTO: NEGATIVE
SODIUM SERPL-SCNC: 142 MMOL/L (ref 136–145)
SP GR UR STRIP.AUTO: >1.03 (ref 1–1.03)
TRIGL SERPL-MCNC: 124 MG/DL (ref 30–149)
TSI SER-ACNC: 2.45 UIU/ML (ref 0.55–4.78)
UROBILINOGEN UR STRIP.AUTO-MCNC: NORMAL MG/DL
VLDLC SERPL CALC-MCNC: 23 MG/DL (ref 0–30)
WBC # BLD AUTO: 5.3 X10(3) UL (ref 4–11)

## 2025-03-29 PROCEDURE — 80061 LIPID PANEL: CPT

## 2025-03-29 PROCEDURE — 36415 COLL VENOUS BLD VENIPUNCTURE: CPT

## 2025-03-29 PROCEDURE — 81003 URINALYSIS AUTO W/O SCOPE: CPT

## 2025-03-29 PROCEDURE — 84443 ASSAY THYROID STIM HORMONE: CPT

## 2025-03-29 PROCEDURE — 85025 COMPLETE CBC W/AUTO DIFF WBC: CPT

## 2025-03-29 PROCEDURE — 80053 COMPREHEN METABOLIC PANEL: CPT

## 2025-04-04 ENCOUNTER — HOSPITAL ENCOUNTER (OUTPATIENT)
Dept: GENERAL RADIOLOGY | Age: 25
Discharge: HOME | End: 2025-04-04
Attending: STUDENT IN AN ORGANIZED HEALTH CARE EDUCATION/TRAINING PROGRAM
Payer: COMMERCIAL

## 2025-04-04 DIAGNOSIS — M54.50 LOW BACK PAIN, UNSPECIFIED BACK PAIN LATERALITY, UNSPECIFIED CHRONICITY, UNSPECIFIED WHETHER SCIATICA PRESENT: ICD-10-CM

## 2025-04-04 PROCEDURE — 72110 X-RAY EXAM L-2 SPINE 4/>VWS: CPT | Performed by: STUDENT IN AN ORGANIZED HEALTH CARE EDUCATION/TRAINING PROGRAM

## 2025-04-04 PROCEDURE — 72100 X-RAY EXAM L-S SPINE 2/3 VWS: CPT | Performed by: STUDENT IN AN ORGANIZED HEALTH CARE EDUCATION/TRAINING PROGRAM

## 2025-04-07 ENCOUNTER — TELEPHONE (OUTPATIENT)
Facility: CLINIC | Age: 25
End: 2025-04-07

## 2025-04-07 NOTE — TELEPHONE ENCOUNTER
Patient scheduled online for lower back pain. Please advise if imaging is needed.   Future Appointments   Date Time Provider Department Center   4/25/2025  7:00 AM Nathan Metzger MD [facility] [address]   3/13/2026  8:30 AM Mahamed Carpenter MD [facility] [address] [facility] [address]

## 2025-04-25 ENCOUNTER — OFFICE VISIT (OUTPATIENT)
Facility: CLINIC | Age: 25
End: 2025-04-25
Payer: COMMERCIAL

## 2025-04-25 ENCOUNTER — PATIENT MESSAGE (OUTPATIENT)
Facility: CLINIC | Age: 25
End: 2025-04-25

## 2025-04-25 VITALS — BODY MASS INDEX: 25.73 KG/M2 | HEIGHT: 72 IN | WEIGHT: 190 LBS

## 2025-04-25 DIAGNOSIS — M54.16 LUMBAR RADICULOPATHY: Primary | ICD-10-CM

## 2025-04-25 PROCEDURE — 99204 OFFICE O/P NEW MOD 45 MIN: CPT | Performed by: STUDENT IN AN ORGANIZED HEALTH CARE EDUCATION/TRAINING PROGRAM

## 2025-04-25 RX ORDER — MELOXICAM 15 MG/1
15 TABLET ORAL DAILY
Qty: 30 TABLET | Refills: 0 | Status: SHIPPED | OUTPATIENT
Start: 2025-04-25

## 2025-04-25 RX ORDER — MELOXICAM 15 MG/1
15 TABLET ORAL DAILY
Qty: 30 TABLET | Refills: 0 | OUTPATIENT
Start: 2025-04-25

## 2025-04-25 RX ORDER — METHYLPREDNISOLONE 4 MG/1
TABLET ORAL
Qty: 21 TABLET | Refills: 0 | Status: SHIPPED | OUTPATIENT
Start: 2025-04-25 | End: 2025-04-25

## 2025-04-25 RX ORDER — METHYLPREDNISOLONE 4 MG/1
TABLET ORAL
Qty: 21 TABLET | Refills: 0 | Status: SHIPPED | OUTPATIENT
Start: 2025-04-25

## 2025-04-25 NOTE — TELEPHONE ENCOUNTER
Medrol Dose Pack    DOS: n/a  Last OV: 4/25/25  Last refill date: 12/30/24 #/refills: 1 dose pack (21 pills)/0  Upcoming appt:   Future Appointments   Date Time Provider Department Center   3/13/2026  8:30 AM Mahamed Carpenter MD [facility] [address] [facility] [address]      Spoke with patient he needs the prescription to go to [facility] in [employer] due to new insurance.  [facility]  New prescription sent to [facility].

## 2025-04-25 NOTE — TELEPHONE ENCOUNTER
Last time medication was refilled 04/25/2025  Last office visit  03/08/2025  Next office visit due/scheduled   Future Appointments   Date Time Provider Department Center   3/13/2026  8:30 AM Mahamed Carpenter MD [facility] [address] [facility] [address]       Duplicate Request.

## 2025-04-25 NOTE — H&P
[facility] - ORTHOPEDICS  [address], [address] [address]  [address] [place] [id number]  [phone]     NEW PATIENT VISIT - HISTORY AND PHYSICAL EXAMINATION     Name: Paulie Ochoa   MRN: [id number]  Date: 04/25/25       CC: back and leg pain    REFERRED BY: Mahamed Carpenter MD    HPI:   Paulie Ochoa is a very pleasant 25 year old male who presents today for evaluation of back and leg pain. The distribution of symptoms are: 50% backpain and 50% leg pain. The symptoms began many year(s) ago without any significant injury. Since the onset, the symptoms have rapidly deteriorated  over the past 4 months. Patient feels pain is aggravated by bending and improved by standing. The patient reports no numbness and no weakness.  The symptom characteristics are as follows: Patient is a 25-year-old male with history of lumbar disc herniation presenting with recurrent back pain radiating down left lateral thigh.     Prior spine surgery: none.    Bowel and bladder symptoms: absent.    The patient has not had issues with balance and/or hand dexterity problems such as changes in penmanship or the use of buttons or zippers.    Treatment up to this time has included:    Evaluation: PCP  NSAIDS: have not been prescribed  Narcotic use: None  Physical therapy: None  Spinal injections: None  Others:       PMH:   Past Medical History[1]    PAST SURGICAL HX:  Past Surgical History[2]    FAMILY HX:  Family History[3]    ALLERGIES:  Patient has no known allergies.    MEDICATIONS: Current Medications[4]    ROS: A comprehensive 14 point review of systems was performed and was negative aside from the aforementioned per history of present illness.    SOCIAL HX:  Social History     Tobacco Use    Smoking status: Every Day    Smokeless tobacco: Never   Substance Use Topics    Alcohol use: No         PE:   Vitals:    04/25/25 0707   Weight: 190 lb (86.2 kg)   Height: 6' (1.829 m)      Estimated body mass index is 25.77 kg/m² as calculated from the following:    Height as of this encounter: 6' (1.829 m).    Weight as of this encounter: 190 lb (86.2 kg).    Physical Exam  Constitutional:       Appearance: Normal appearance.   HENT:      Head: Normocephalic and atraumatic.   Eyes:      Extraocular Movements: Extraocular movements intact.   Cardiovascular:      Pulses: Normal pulses. Skin warm and well perfused.  Pulmonary:      Effort: Pulmonary effort is normal. No respiratory distress.   Skin:     General: Skin is warm.   Psychiatric:         Mood and Affect: Mood normal.     Spine Exam:    Normal gait without difficulty  Able to heel, toe, tandem gait without difficulty  Level shoulders and hips in even stance    Restricted L-spine ROM    No tenderness to palpation of L-spine    Straight leg raise test: negative    Sustained clonus: negative    LE Strength: 5/5 IP QUAD TA EHL GSC  LE Sensation: normal in L2-S1 distribution  LE reflexes: normal    Radiographic Examination/Diagnostics:  XR personally viewed, independently interpreted and radiology report was reviewed.  X-ray of the lumbar spine demonstrates no significant pathology      IMPRESSION: Paulie Ochoa is a 25 year old male with possible lumbar disc disease and radiculopathy    PLAN:     We reviewed the patients history, symptoms, exam findings, and imaging today.  We had a detailed discussion outlining the etiology, anatomy, pathophysiology, and natural history of lumbar radiculopathy. The typical management of this condition may include lifestyle modification, NSAIDs, physical therapy, oral steroids, epidural injections, neuromodulatory medications, and sometimes pain medications.  Based on our discussion today we would like to have the patient initiate our recommendations for continued conservative therapy in the treatment of their condition noted in the assessment section.     - Provided home exercise program  - Referred  to Physical Therapy: home exercise program, aerobic exercises, core strengthening and conditioning, possible manipulative therapy,  and modalities as indicated  - Prescribed steroids and NSAIDs      FOLLOW-UP:  We will see him back in follow-up in 2 months, or sooner if any problems arise. Patient understands and agrees with plan.      Nathan Metzger MD  Orthopedic Spine Surgeon  [facility] [place] [facility]   [address] [place] [address].[email or web address]  [email or web address]  t: [phone]   f: [phone]        This note was dictated using Dragon software.  While it was briefly proofread prior to completion, some grammatical, spelling, and word choice errors due to dictation may still occur.             [1]   Past Medical History:   Abdominal pain    Back pain    Bloating    Constipation    Diarrhea, unspecified    Flatulence/gas pain/belching    Heartburn    Indigestion    Nausea   [2]   Past Surgical History:  Procedure Laterality Date    Tonsillectomy  2011   [3]   Family History  Problem Relation Age of Onset    Cancer Maternal Grandmother     Stroke Maternal Grandfather     Heart Attack Paternal Grandfather 45    High Cholesterol Father    [4]   Current Outpatient Medications   Medication Sig Dispense Refill    methylPREDNISolone (MEDROL) 4 MG Oral Tablet Therapy Pack As directed. 21 tablet 0    Meloxicam 15 MG Oral Tab Take 1 tablet (15 mg total) by mouth daily. 30 tablet 0

## 2025-04-25 NOTE — PATIENT INSTRUCTIONS
Sciatic nerve glides:  This exercise will keep your nerves moving to prevent scarring around the nerves.  While seated, extend your neck (look up), straighten your knee, and dorsiflex your ankle by pointing your toes at your head. Then flex your neck (look down) and plantarflex your ankle by pointing your toes away from you. Do your left side then right side. Do this 12 times on each side.  Do this exercise 3 times per day.    “Seated sciatic nerve floss” (Daljit, 2021)

## 2025-05-30 ENCOUNTER — HOSPITAL ENCOUNTER (OUTPATIENT)
Dept: GENERAL RADIOLOGY | Age: 25
Discharge: HOME | End: 2025-05-30
Attending: INTERNAL MEDICINE
Payer: COMMERCIAL

## 2025-05-30 DIAGNOSIS — F17.200 VAPING NICOTINE DEPENDENCE, NON-TOBACCO PRODUCT: ICD-10-CM

## 2025-05-30 DIAGNOSIS — R05.1 ACUTE COUGH: ICD-10-CM

## 2025-05-30 PROCEDURE — 71046 X-RAY EXAM CHEST 2 VIEWS: CPT | Performed by: INTERNAL MEDICINE

## 2025-06-03 RX ORDER — MELOXICAM 15 MG/1
15 TABLET ORAL DAILY
Qty: 30 TABLET | Refills: 0 | Status: SHIPPED | OUTPATIENT
Start: 2025-06-03

## 2025-06-03 NOTE — TELEPHONE ENCOUNTER
Meloxicam 15mg    DOS: n/a  Last OV: 4/25/25  Lumbar radiculopathy   Last refill date: 5/6/25  #/refills: 30/0  Upcoming appt:   Future Appointments   Date Time Provider Department Center   3/13/2026  8:30 AM Mahamed Carpenter MD [facility] [address]         Component  Ref Range & Units (hover) 3/29/25  8:16 AM   BUN 20   Creatinine 1.18   eGFR-Cr 88